# Patient Record
Sex: MALE | Race: WHITE | NOT HISPANIC OR LATINO | URBAN - METROPOLITAN AREA
[De-identification: names, ages, dates, MRNs, and addresses within clinical notes are randomized per-mention and may not be internally consistent; named-entity substitution may affect disease eponyms.]

---

## 2019-08-14 ENCOUNTER — OFFICE (OUTPATIENT)
Dept: URBAN - METROPOLITAN AREA CLINIC 101 | Facility: CLINIC | Age: 59
End: 2019-08-14
Payer: COMMERCIAL

## 2019-08-14 VITALS
WEIGHT: 173 LBS | HEIGHT: 69 IN | SYSTOLIC BLOOD PRESSURE: 154 MMHG | DIASTOLIC BLOOD PRESSURE: 79 MMHG | HEART RATE: 63 BPM | TEMPERATURE: 98.1 F

## 2019-08-14 DIAGNOSIS — Z86.010 PERSONAL HISTORY OF COLONIC POLYPS: ICD-10-CM

## 2019-08-14 PROCEDURE — 99244 OFF/OP CNSLTJ NEW/EST MOD 40: CPT

## 2019-08-14 NOTE — SERVICEHPINOTES
IGOR ELIZONDO   is a   59   year old male who is being seen in consultation at the request of   GREGOR REHMAN   for OV prior to colonoscopy. His last colonoscopy in 2013 removed benign polyps per patient report and recall 5 yrs. He mentions weight loss 1-2 months ago that he attributes to viral illness at that time, which was found to be EBV. He states weight has been back to normal baseline weight. He was seen by his PCP for this weight loss who ordered RUQ u/s and labs that were all normal. He has daily BMs, BSS type 4 to 5 predominately. No known cardiac or pulmonary disease. Denies chest pain, n/v, abdominal pain, change in bowel habits, rectal bleeding, blood in stool, weight loss. No other complaints. BR